# Patient Record
Sex: MALE | Race: WHITE | Employment: FULL TIME | URBAN - METROPOLITAN AREA
[De-identification: names, ages, dates, MRNs, and addresses within clinical notes are randomized per-mention and may not be internally consistent; named-entity substitution may affect disease eponyms.]

---

## 2017-07-27 ENCOUNTER — APPOINTMENT (OUTPATIENT)
Dept: LAB | Age: 52
End: 2017-07-27
Attending: INTERNAL MEDICINE
Payer: COMMERCIAL

## 2017-07-27 ENCOUNTER — OFFICE VISIT (OUTPATIENT)
Dept: INTERNAL MEDICINE CLINIC | Facility: CLINIC | Age: 52
End: 2017-07-27

## 2017-07-27 VITALS
RESPIRATION RATE: 16 BRPM | TEMPERATURE: 98 F | DIASTOLIC BLOOD PRESSURE: 72 MMHG | HEIGHT: 68 IN | WEIGHT: 156 LBS | SYSTOLIC BLOOD PRESSURE: 120 MMHG | BODY MASS INDEX: 23.64 KG/M2 | HEART RATE: 76 BPM

## 2017-07-27 DIAGNOSIS — R10.30 LOWER ABDOMINAL PAIN: ICD-10-CM

## 2017-07-27 DIAGNOSIS — R35.0 URINARY FREQUENCY: Primary | ICD-10-CM

## 2017-07-27 LAB
ALBUMIN SERPL BCP-MCNC: 4.3 G/DL (ref 3.5–4.8)
ALBUMIN/GLOB SERPL: 1.6 {RATIO} (ref 1–2)
ALP SERPL-CCNC: 55 U/L (ref 32–100)
ALT SERPL-CCNC: 16 U/L (ref 17–63)
ANION GAP SERPL CALC-SCNC: 10 MMOL/L (ref 0–18)
APPEARANCE: CLEAR
AST SERPL-CCNC: 24 U/L (ref 15–41)
BASOPHILS # BLD: 0 K/UL (ref 0–0.2)
BASOPHILS NFR BLD: 1 %
BILIRUB SERPL-MCNC: 0.9 MG/DL (ref 0.3–1.2)
BILIRUBIN: NEGATIVE
BUN SERPL-MCNC: 21 MG/DL (ref 8–20)
BUN/CREAT SERPL: 16.3 (ref 10–20)
CALCIUM SERPL-MCNC: 9 MG/DL (ref 8.5–10.5)
CHLORIDE SERPL-SCNC: 100 MMOL/L (ref 95–110)
CO2 SERPL-SCNC: 29 MMOL/L (ref 22–32)
CREAT SERPL-MCNC: 1.29 MG/DL (ref 0.5–1.5)
EOSINOPHIL # BLD: 0.1 K/UL (ref 0–0.7)
EOSINOPHIL NFR BLD: 2 %
ERYTHROCYTE [DISTWIDTH] IN BLOOD BY AUTOMATED COUNT: 12.7 % (ref 11–15)
GLOBULIN PLAS-MCNC: 2.7 G/DL (ref 2.5–3.7)
GLUCOSE (URINE DIPSTICK): NEGATIVE MG/DL
GLUCOSE SERPL-MCNC: 64 MG/DL (ref 70–99)
HCT VFR BLD AUTO: 47.8 % (ref 41–52)
HGB BLD-MCNC: 16.5 G/DL (ref 13.5–17.5)
KETONES (URINE DIPSTICK): NEGATIVE MG/DL
LEUKOCYTES: NEGATIVE
LYMPHOCYTES # BLD: 1.4 K/UL (ref 1–4)
LYMPHOCYTES NFR BLD: 26 %
MCH RBC QN AUTO: 32.2 PG (ref 27–32)
MCHC RBC AUTO-ENTMCNC: 34.5 G/DL (ref 32–37)
MCV RBC AUTO: 93.5 FL (ref 80–100)
MONOCYTES # BLD: 0.6 K/UL (ref 0–1)
MONOCYTES NFR BLD: 11 %
MULTISTIX LOT#: NORMAL NUMERIC
NEUTROPHILS # BLD AUTO: 3.3 K/UL (ref 1.8–7.7)
NEUTROPHILS NFR BLD: 59 %
NITRITE, URINE: NEGATIVE
OSMOLALITY UR CALC.SUM OF ELEC: 289 MOSM/KG (ref 275–295)
PH, URINE: 6 (ref 4.5–8)
PLATELET # BLD AUTO: 217 K/UL (ref 140–400)
PMV BLD AUTO: 8.8 FL (ref 7.4–10.3)
POTASSIUM SERPL-SCNC: 3.3 MMOL/L (ref 3.3–5.1)
PROT SERPL-MCNC: 7 G/DL (ref 5.9–8.4)
PROTEIN (URINE DIPSTICK): NEGATIVE MG/DL
RBC # BLD AUTO: 5.11 M/UL (ref 4.5–5.9)
SODIUM SERPL-SCNC: 139 MMOL/L (ref 136–144)
SPECIFIC GRAVITY: 1.01 (ref 1–1.03)
URINE-COLOR: YELLOW
UROBILINOGEN,SEMI-QN: 1 MG/DL (ref 0–1.9)
WBC # BLD AUTO: 5.5 K/UL (ref 4–11)

## 2017-07-27 PROCEDURE — 99213 OFFICE O/P EST LOW 20 MIN: CPT | Performed by: INTERNAL MEDICINE

## 2017-07-27 PROCEDURE — 85025 COMPLETE CBC W/AUTO DIFF WBC: CPT | Performed by: INTERNAL MEDICINE

## 2017-07-27 PROCEDURE — 80053 COMPREHEN METABOLIC PANEL: CPT | Performed by: INTERNAL MEDICINE

## 2017-07-27 PROCEDURE — 81003 URINALYSIS AUTO W/O SCOPE: CPT | Performed by: INTERNAL MEDICINE

## 2017-07-27 PROCEDURE — 99214 OFFICE O/P EST MOD 30 MIN: CPT | Performed by: INTERNAL MEDICINE

## 2017-07-27 NOTE — PROGRESS NOTES
HPI:    Patient ID: Elmer Cespedes is a 45year old male. Urinary Frequency    This is a new problem. The current episode started in the past 7 days (3 days). The problem occurs intermittently. The problem has been unchanged.  The patient is experienci Constitutional: He appears well-developed and well-nourished. He is cooperative. Non-toxic appearance. He does not have a sickly appearance. He does not appear ill. No distress.    HENT:   Right Ear: External ear normal.   Nose: Nose normal.   Mouth/Thro ABDOMEN +         PELVIS(CONTRAST ONLY) (CPT=74177), CBC WITH         DIFFERENTIAL WITH PLATELET, COMP METABOLIC         PANEL (14), URINALYSIS, ROUTINE, CBC W/         DIFFERENTIAL        Pt has only minimal tenderness on his LLQ abdomen without rebound/m

## 2017-07-31 ENCOUNTER — TELEPHONE (OUTPATIENT)
Dept: INTERNAL MEDICINE CLINIC | Facility: CLINIC | Age: 52
End: 2017-07-31

## 2017-07-31 NOTE — TELEPHONE ENCOUNTER
[facility] called and stated patient is requesting to have CT and blood work done at [place] [address]-    Please fax to: [phone]

## 2017-08-02 NOTE — TELEPHONE ENCOUNTER
Phone call to patient's home number. With HIPPA approval,  left advising order approved. If he wants it faxed to [address] need a fax number( or phone number to all and get a fax number) or if he wants to p/u.  Order in Dr. Turner Arboleda pending f

## 2017-08-02 NOTE — TELEPHONE ENCOUNTER
Faxed order to [facility]; failed x's 3. Phone call to Sabina Hammond to verify fax number. S/W Laverne Mallory who states order no longer needed; order approved for [facility]. Valid thru 7/27/17 to 10/25/17.

## 2017-08-07 ENCOUNTER — TELEPHONE (OUTPATIENT)
Dept: INTERNAL MEDICINE CLINIC | Facility: CLINIC | Age: 52
End: 2017-08-07

## 2017-08-07 DIAGNOSIS — K76.89 LIVER CYST: Primary | ICD-10-CM

## 2017-08-07 DIAGNOSIS — K63.9 SIGMOID THICKENING: ICD-10-CM

## 2017-08-07 NOTE — TELEPHONE ENCOUNTER
LMTCB-CSS/RIKA please offer appt with Dr. Kristina Kumar on 8/9/17 @ 12:30pm for consult, thank you. (Pt is to arrive at 12:15pm).

## 2017-08-07 NOTE — TELEPHONE ENCOUNTER
Pt. has accepted appt with Dr Alvarado Ba for Consult for  8/9 at 12:15. Pt. States that he has [employer] which is out of network, pt. Was informed and pt. Would still like to book appt.

## 2017-08-07 NOTE — TELEPHONE ENCOUNTER
pls call pt  His ct scan  abd showed some thickening of his sigmoid colon (lower part of his large intestine) which is likely reason why he is having lower abdominal pain.  This may be inflammation but other causes will need to be ruled out  So will need to

## 2017-08-07 NOTE — TELEPHONE ENCOUNTER
Tasked to Dr Jessica Huynh and GI clinical staff. See Dr Cash Dress message below and assist with scheduling appt soon with GI. Thank you.

## 2017-08-07 NOTE — TELEPHONE ENCOUNTER
Dr Mary Swann,    I reviewed your CT abdomen results with pt who verbalized understanding. He stated would have to check with his insurance for specialty coverage in network. He used to have [address] and now has [place] [employer].  I mailed ultrasound of liver and GI r

## 2017-09-05 ENCOUNTER — HOSPITAL (OUTPATIENT)
Dept: OTHER | Age: 52
End: 2017-09-05
Attending: INTERNAL MEDICINE

## 2017-09-30 ENCOUNTER — TELEPHONE (OUTPATIENT)
Dept: INTERNAL MEDICINE CLINIC | Facility: CLINIC | Age: 52
End: 2017-09-30

## 2017-09-30 NOTE — TELEPHONE ENCOUNTER
pls call pt; he was supposed to see our GI specialist for consultation regarding abnormality seen in his ct scan (thickening of his sigmoid colon); I dont see in [facility] that he did see Dr Fady Hill as he agreed to last month.  pls check with pt if he saw another

## 2017-10-03 NOTE — TELEPHONE ENCOUNTER
he should not wait since malignancy or cancer of colon needs to be ruled out vs inflammation when we see thickening of the wall of the colon.  He should see GI specialist right away since this was already back almost 2mos ago when we saw  Him for his abdom

## 2017-10-03 NOTE — TELEPHONE ENCOUNTER
Pt called back, relayed Dr Kamila Erwin- stated the Dr he was  referred to was not on his plan and he have found a Dr to do the Colonoscopy but wanted to wait -hoping to feel better,otherwise he might just wait awhile due to getting another INS

## 2017-10-06 ENCOUNTER — TELEPHONE (OUTPATIENT)
Dept: INTERNAL MEDICINE CLINIC | Facility: CLINIC | Age: 52
End: 2017-10-06

## 2017-12-18 ENCOUNTER — TELEPHONE (OUTPATIENT)
Dept: INTERNAL MEDICINE CLINIC | Facility: CLINIC | Age: 52
End: 2017-12-18

## 2017-12-18 NOTE — TELEPHONE ENCOUNTER
Call and ffup this pt; he was told several times to see GI specialist to have colonscopy due to his abormal ct scan. I dont see any GI consults in [facility]. Did he go somewhere else?

## 2017-12-30 NOTE — TELEPHONE ENCOUNTER
Per pt he has not seen GI and plans on seeing one to get Colonoscopy done sometimes next yr after Feb 2018

## 2017-12-30 NOTE — TELEPHONE ENCOUNTER
VM left relating Dr. Jerod Rushing message and requesting patient call us back to update us. Office phone number given.

## 2018-01-04 NOTE — TELEPHONE ENCOUNTER
As I had said before, he should not be waiting since malignancy of colon needs to ruled out vs inflammation given his findings on his ct scan back in Oct 2107.  If this turns out to be malignancy,this can progress with waiting and can be detrimental for him

## 2018-01-06 NOTE — TELEPHONE ENCOUNTER
Patient called and informed. Phone number for GI. Gastroenterology can we please see if we can get the patient in soon. Thanks.

## 2018-01-08 NOTE — TELEPHONE ENCOUNTER
I called Lakisha Alfaro and left a message to call our office.     I put a hold on an appt w/Dr Henry Garcia on 01/12/18 @ 1:30 pm. IGNACIO/RIKA if the pt calls please schedule him

## 2018-01-09 ENCOUNTER — TELEPHONE (OUTPATIENT)
Dept: GASTROENTEROLOGY | Facility: CLINIC | Age: 53
End: 2018-01-09

## 2018-01-09 NOTE — TELEPHONE ENCOUNTER
Dr. Radha VILLALBA    Pt told CSS that he did not want that appt and that he had already told \"someone else\" that he could not come in, then proceeded to hang up on CSS when she attempted to transfer to GI RN's to speak with the patient regarding your message from 12/18/17.

## 2018-01-09 NOTE — TELEPHONE ENCOUNTER
FYI - Pt ret'd call. See 12/18/17 Dr Shanon Mancia. Pt refused appt on 1/12/18 with Dr. Franklin Weir. Attempted to reach RN but pt hung up before CSS could reach RN. Min Carter

## 2018-01-23 NOTE — TELEPHONE ENCOUNTER
Pt has an appt scheduled with Dr Michelle Nichols  Date Time Provider [place]   2/21/2018 9:30 AM Jose Ewing MD [facility]

## 2018-02-21 ENCOUNTER — TELEPHONE (OUTPATIENT)
Dept: GASTROENTEROLOGY | Facility: CLINIC | Age: 53
End: 2018-02-21

## 2018-02-21 ENCOUNTER — OFFICE VISIT (OUTPATIENT)
Dept: GASTROENTEROLOGY | Facility: CLINIC | Age: 53
End: 2018-02-21

## 2018-02-21 VITALS
HEIGHT: 68 IN | SYSTOLIC BLOOD PRESSURE: 111 MMHG | HEART RATE: 84 BPM | DIASTOLIC BLOOD PRESSURE: 75 MMHG | WEIGHT: 150.81 LBS | BODY MASS INDEX: 22.86 KG/M2

## 2018-02-21 DIAGNOSIS — Z12.11 COLON CANCER SCREENING: ICD-10-CM

## 2018-02-21 DIAGNOSIS — R10.32 LLQ ABDOMINAL PAIN: Primary | ICD-10-CM

## 2018-02-21 DIAGNOSIS — R10.32 LEFT LOWER QUADRANT PAIN: Primary | ICD-10-CM

## 2018-02-21 PROCEDURE — 99243 OFF/OP CNSLTJ NEW/EST LOW 30: CPT | Performed by: INTERNAL MEDICINE

## 2018-02-21 PROCEDURE — 99212 OFFICE O/P EST SF 10 MIN: CPT | Performed by: INTERNAL MEDICINE

## 2018-02-21 NOTE — PROGRESS NOTES
America Maza is a 47year old male.     HPI:   Patient presents with:  Colonoscopy Screening  Abdomen/Flank Pain (GI/): LLQ    The patient is a 43-year-old who reports a history of chronic abdominal issues going back years who has been experiencing ch symptoms. PHYSICAL EXAM:   Blood pressure 111/75, pulse 84, height 5' 8\" (1.727 m), weight 150 lb 12.8 oz (68.4 kg).     The patient appears their stated age and is in no acute distress  HEENT- anicteric sclera, neck no lymphadnopathy, OP- clear with n

## 2018-02-21 NOTE — TELEPHONE ENCOUNTER
Scheduled for: Colonoscopy [address]   Provider Name:   Date:  3/20/18  Location:  [address] [place] [address]   Sedation:  Mac  Time:  0830 Shana Rondon 0730  Prep: Pt had received and purchase the Colyte Prep from Outside GI office  Meds/Allergies Reconciled?:  Physician ramy

## 2018-02-21 NOTE — PATIENT INSTRUCTIONS
Abdominal pain/colon screening/abnormal CT scan (thickening of sigmoid colon)  - colonoscopy with IV or MAC sedation  - he has a prep at home- please check and see what type he has   - review CT scan abdomen/pelvis

## 2018-03-20 ENCOUNTER — SURGERY (OUTPATIENT)
Age: 53
End: 2018-03-20

## 2018-03-20 ENCOUNTER — ANESTHESIA EVENT (OUTPATIENT)
Dept: ENDOSCOPY | Age: 53
End: 2018-03-20
Payer: COMMERCIAL

## 2018-03-20 ENCOUNTER — HOSPITAL ENCOUNTER (OUTPATIENT)
Age: 53
Setting detail: HOSPITAL OUTPATIENT SURGERY
Discharge: HOME | End: 2018-03-20
Attending: INTERNAL MEDICINE | Admitting: INTERNAL MEDICINE
Payer: COMMERCIAL

## 2018-03-20 ENCOUNTER — ANESTHESIA (OUTPATIENT)
Dept: ENDOSCOPY | Age: 53
End: 2018-03-20
Payer: COMMERCIAL

## 2018-03-20 VITALS
BODY MASS INDEX: 22.73 KG/M2 | HEIGHT: 68 IN | SYSTOLIC BLOOD PRESSURE: 100 MMHG | WEIGHT: 150 LBS | RESPIRATION RATE: 22 BRPM | HEART RATE: 82 BPM | DIASTOLIC BLOOD PRESSURE: 67 MMHG | OXYGEN SATURATION: 96 %

## 2018-03-20 DIAGNOSIS — Z12.11 COLON CANCER SCREENING: ICD-10-CM

## 2018-03-20 DIAGNOSIS — R10.32 LLQ ABDOMINAL PAIN: ICD-10-CM

## 2018-03-20 PROCEDURE — 99070 SPECIAL SUPPLIES PHYS/QHP: CPT | Performed by: INTERNAL MEDICINE

## 2018-03-20 PROCEDURE — 45380 COLONOSCOPY AND BIOPSY: CPT | Performed by: INTERNAL MEDICINE

## 2018-03-20 RX ORDER — INSULIN ASPART 100 [IU]/ML
INJECTION, SOLUTION INTRAVENOUS; SUBCUTANEOUS ONCE
Status: DISCONTINUED | OUTPATIENT
Start: 2018-03-20 | End: 2018-03-20

## 2018-03-20 RX ORDER — LIDOCAINE HYDROCHLORIDE 10 MG/ML
INJECTION, SOLUTION EPIDURAL; INFILTRATION; INTRACAUDAL; PERINEURAL AS NEEDED
Status: DISCONTINUED | OUTPATIENT
Start: 2018-03-20 | End: 2018-03-20 | Stop reason: SURG

## 2018-03-20 RX ORDER — MIDAZOLAM HYDROCHLORIDE 1 MG/ML
INJECTION INTRAMUSCULAR; INTRAVENOUS AS NEEDED
Status: DISCONTINUED | OUTPATIENT
Start: 2018-03-20 | End: 2018-03-20 | Stop reason: SURG

## 2018-03-20 RX ADMIN — MIDAZOLAM HYDROCHLORIDE 2 MG: 1 INJECTION INTRAMUSCULAR; INTRAVENOUS at 08:25:00

## 2018-03-20 RX ADMIN — LIDOCAINE HYDROCHLORIDE 50 MG: 10 INJECTION, SOLUTION EPIDURAL; INFILTRATION; INTRACAUDAL; PERINEURAL at 08:25:00

## 2018-03-20 NOTE — OPERATIVE REPORT
[facility] HOSP - [facility] Endoscopy Report      Preoperative Diagnosis:  - colon screening  - abnormal CT scan      Postoperative Diagnosis:  - small internal hemorrhoids      Procedure:    Colonoscopy      Surgeon:  Emmett Lebron M.D.     Anesthesia:  MA

## 2018-03-20 NOTE — H&P
History & Physical Examination    Patient Name: Wm Hutchison  MRN: [id number]  [facility]: [id number]  YOB: 1965    Diagnosis: abn CT scan, colon screening        No prescriptions prior to admission.     Current Facility-Administered Medications:

## 2018-03-20 NOTE — ANESTHESIA POSTPROCEDURE EVALUATION
Patient: Geovanny Aj    Procedure Summary     Date:  03/20/18 Room / Location:  [facility] ENDOSCOPY 01 / [facility] ENDO    Anesthesia Start:  0825 Anesthesia Stop:  0900    Procedure:  COLONOSCOPY (N/A ) Diagnosis:       LLQ abdominal pain      Colon cancer s

## 2018-03-22 ENCOUNTER — TELEPHONE (OUTPATIENT)
Dept: GASTROENTEROLOGY | Facility: CLINIC | Age: 53
End: 2018-03-22

## 2018-03-22 NOTE — TELEPHONE ENCOUNTER
----- Message from Joy Lopez MD sent at 3/22/2018 10:43 AM CDT -----  RN to place on call back for 10 years.    See TE

## 2018-03-22 NOTE — TELEPHONE ENCOUNTER
The patient was contacted, results of his colonoscopy were reviewed. No evidence of cause of his abdominal symptoms was noted on the colonoscopy. Random biopsies were negative. I have advised repeat colonoscopy in 10 years time.     The patient reports h

## 2018-04-16 ENCOUNTER — NURSE TRIAGE (OUTPATIENT)
Dept: INTERNAL MEDICINE CLINIC | Facility: CLINIC | Age: 53
End: 2018-04-16

## 2018-04-16 ENCOUNTER — OFFICE VISIT (OUTPATIENT)
Dept: INTERNAL MEDICINE CLINIC | Facility: CLINIC | Age: 53
End: 2018-04-16

## 2018-04-16 VITALS
WEIGHT: 150.13 LBS | BODY MASS INDEX: 23 KG/M2 | SYSTOLIC BLOOD PRESSURE: 110 MMHG | DIASTOLIC BLOOD PRESSURE: 70 MMHG | HEART RATE: 97 BPM | TEMPERATURE: 100 F

## 2018-04-16 DIAGNOSIS — R50.9 FEVER, UNSPECIFIED FEVER CAUSE: Primary | ICD-10-CM

## 2018-04-16 DIAGNOSIS — M79.10 MYALGIA: ICD-10-CM

## 2018-04-16 PROCEDURE — 99212 OFFICE O/P EST SF 10 MIN: CPT | Performed by: INTERNAL MEDICINE

## 2018-04-16 PROCEDURE — 87880 STREP A ASSAY W/OPTIC: CPT | Performed by: INTERNAL MEDICINE

## 2018-04-16 PROCEDURE — 99214 OFFICE O/P EST MOD 30 MIN: CPT | Performed by: INTERNAL MEDICINE

## 2018-04-16 NOTE — PROGRESS NOTES
HPI:    Patient ID: Pepper Payne is a 47year old male. Fever    This is a new problem. The current episode started in the past 7 days (4 days). The problem occurs intermittently. The problem has been unchanged.  The maximum temperature noted was 99 abscesses. Eyes: Conjunctivae and EOM are normal. Pupils are equal, round, and reactive to light. Right eye exhibits no discharge. Left eye exhibits no discharge. No scleral icterus. Neck: Normal range of motion. Neck supple.  No spinous process tendern in this encounter.       Meds This Visit:  No prescriptions requested or ordered in this encounter    Imaging & Referrals:  None       AA#[id number]

## 2018-04-16 NOTE — TELEPHONE ENCOUNTER
Reason for Disposition  • Fever present > 3 days (72 hours)    Protocols used: INFLUENZA - SEASONAL-A-OH

## 2018-04-17 ENCOUNTER — TELEPHONE (OUTPATIENT)
Dept: INTERNAL MEDICINE CLINIC | Facility: CLINIC | Age: 53
End: 2018-04-17

## 2018-04-17 NOTE — TELEPHONE ENCOUNTER
Pls notify pt; his flu swab test was negative. Check pt how he is doing.  ffup in clinic if symptoms not improving or getting worse

## 2018-04-18 ENCOUNTER — TELEPHONE (OUTPATIENT)
Dept: INTERNAL MEDICINE CLINIC | Facility: CLINIC | Age: 53
End: 2018-04-18

## 2018-04-18 ENCOUNTER — TELEPHONE (OUTPATIENT)
Dept: GASTROENTEROLOGY | Facility: CLINIC | Age: 53
End: 2018-04-18

## 2018-04-18 NOTE — TELEPHONE ENCOUNTER
Dr Rosemary Pineda, please advise. Saw Dr Aide Shelley 4/16/18 fever, myalgia, better 4/17/18, worse today. New symptoms, mouth sore, gums tender, sore when pressure on teeth. Throat worse, more irritated.  Still has fever with temp 100, PND, glands still sw

## 2018-04-18 NOTE — TELEPHONE ENCOUNTER
Ok to give a note for work; we can do blood test for mono and cmv which are viral infections that can possibly cause his symptoms. Treatment still symptomatic though since no specific therapy for these viral infections if this is what he has.  ER if he cont

## 2018-04-18 NOTE — TELEPHONE ENCOUNTER
Dr Samantha Yoder gave pt's CD of CT abd/pelvis done at [facility] to me--it is now downloaded in our system, so not needed. I contacted pt. He does not want it either, so I broke in half and discarded.

## 2018-04-19 NOTE — TELEPHONE ENCOUNTER
Advised patient of Dr. Keane Cancer note. Patient verbalized understanding. Patient indicated that feeling alittle better now, so will hold off on blood work for now. Patient is going back to work tomorrow.  Will call back tomorrow morning with fax number t

## 2018-10-04 ENCOUNTER — OFFICE VISIT (OUTPATIENT)
Dept: INTERNAL MEDICINE CLINIC | Facility: CLINIC | Age: 53
End: 2018-10-04
Payer: COMMERCIAL

## 2018-10-04 VITALS
HEIGHT: 68 IN | TEMPERATURE: 98 F | BODY MASS INDEX: 23.19 KG/M2 | SYSTOLIC BLOOD PRESSURE: 123 MMHG | WEIGHT: 153 LBS | DIASTOLIC BLOOD PRESSURE: 72 MMHG | HEART RATE: 71 BPM

## 2018-10-04 DIAGNOSIS — M79.609 PARESTHESIA AND PAIN OF LEFT EXTREMITY: Primary | ICD-10-CM

## 2018-10-04 DIAGNOSIS — Z12.5 PROSTATE CANCER SCREENING: ICD-10-CM

## 2018-10-04 DIAGNOSIS — R20.2 PARESTHESIA AND PAIN OF LEFT EXTREMITY: Primary | ICD-10-CM

## 2018-10-04 PROCEDURE — 99212 OFFICE O/P EST SF 10 MIN: CPT | Performed by: INTERNAL MEDICINE

## 2018-10-04 PROCEDURE — 99214 OFFICE O/P EST MOD 30 MIN: CPT | Performed by: INTERNAL MEDICINE

## 2018-10-04 NOTE — PROGRESS NOTES
HPI:    Patient ID: Reginaldo Pope is a 47year old male. Numbness   This is a chronic (feels numbness on the left upper anterior aspect of the left thigh, occaisionally in the  right lower abdominal area) problem.  The current episode started more candi hemorrhoid, no fissure, no mass, no tenderness and anal tone normal. Prostate is not enlarged and not tender.    Musculoskeletal:        Right hip: Normal.        Left hip: Normal.        Lumbar back: Normal.   Lymphadenopathy:     He has no cervical adenop

## 2018-10-05 PROBLEM — M79.609 PARESTHESIA AND PAIN OF LEFT EXTREMITY: Status: ACTIVE | Noted: 2018-10-05

## 2018-10-05 PROBLEM — R20.2 PARESTHESIA AND PAIN OF LEFT EXTREMITY: Status: ACTIVE | Noted: 2018-10-05

## 2023-07-13 ENCOUNTER — OFFICE VISIT (OUTPATIENT)
Dept: INTERNAL MEDICINE CLINIC | Facility: CLINIC | Age: 58
End: 2023-07-13

## 2023-07-13 VITALS
TEMPERATURE: 99 F | HEIGHT: 68 IN | SYSTOLIC BLOOD PRESSURE: 116 MMHG | DIASTOLIC BLOOD PRESSURE: 70 MMHG | HEART RATE: 82 BPM | OXYGEN SATURATION: 97 % | WEIGHT: 153.38 LBS | BODY MASS INDEX: 23.25 KG/M2

## 2023-07-13 DIAGNOSIS — R10.10 UPPER ABDOMINAL PAIN: Primary | ICD-10-CM

## 2023-07-13 PROCEDURE — 3078F DIAST BP <80 MM HG: CPT | Performed by: INTERNAL MEDICINE

## 2023-07-13 PROCEDURE — 3074F SYST BP LT 130 MM HG: CPT | Performed by: INTERNAL MEDICINE

## 2023-07-13 PROCEDURE — 99204 OFFICE O/P NEW MOD 45 MIN: CPT | Performed by: INTERNAL MEDICINE

## 2023-07-13 PROCEDURE — 3008F BODY MASS INDEX DOCD: CPT | Performed by: INTERNAL MEDICINE

## 2023-07-15 LAB
ABSOLUTE BASOPHILS: 40 CELLS/UL (ref 0–200)
ABSOLUTE EOSINOPHILS: 172 CELLS/UL (ref 15–500)
ABSOLUTE LYMPHOCYTES: 1841 CELLS/UL (ref 850–3900)
ABSOLUTE MONOCYTES: 601 CELLS/UL (ref 200–950)
ABSOLUTE NEUTROPHILS: 3947 CELLS/UL (ref 1500–7800)
ALBUMIN/GLOBULIN RATIO: 1.6 (CALC) (ref 1–2.5)
ALBUMIN: 4.4 G/DL (ref 3.6–5.1)
ALKALINE PHOSPHATASE: 52 U/L (ref 35–144)
ALT: 19 U/L (ref 9–46)
APPEARANCE: CLEAR
AST: 22 U/L (ref 10–35)
BASOPHILS: 0.6 %
BILIRUBIN, TOTAL: 0.5 MG/DL (ref 0.2–1.2)
BILIRUBIN: NEGATIVE
BUN: 18 MG/DL (ref 7–25)
CALCIUM: 10 MG/DL (ref 8.6–10.3)
CARBON DIOXIDE: 32 MMOL/L (ref 20–32)
CHLORIDE: 104 MMOL/L (ref 98–110)
COLOR: YELLOW
CREATININE: 1.04 MG/DL (ref 0.7–1.3)
EGFR: 83 ML/MIN/1.73M2
EOSINOPHILS: 2.6 %
GLOBULIN: 2.8 G/DL (CALC) (ref 1.9–3.7)
GLUCOSE: 75 MG/DL (ref 65–139)
GLUCOSE: NEGATIVE
HEMATOCRIT: 45.7 % (ref 38.5–50)
HEMOGLOBIN: 16.5 G/DL (ref 13.2–17.1)
KETONES: NEGATIVE
LEUKOCYTE ESTERASE: NEGATIVE
LIPASE: 48 U/L (ref 7–60)
LYMPHOCYTES: 27.9 %
MCH: 32.9 PG (ref 27–33)
MCHC: 36.1 G/DL (ref 32–36)
MCV: 91.2 FL (ref 80–100)
MONOCYTES: 9.1 %
MPV: 10.5 FL (ref 7.5–12.5)
NEUTROPHILS: 59.8 %
NITRITE: NEGATIVE
OCCULT BLOOD: NEGATIVE
PH: 5.5 (ref 5–8)
PLATELET COUNT: 232 THOUSAND/UL (ref 140–400)
POTASSIUM: 4.1 MMOL/L (ref 3.5–5.3)
PROTEIN, TOTAL: 7.2 G/DL (ref 6.1–8.1)
PROTEIN: NEGATIVE
RDW: 12.2 % (ref 11–15)
RED BLOOD CELL COUNT: 5.01 MILLION/UL (ref 4.2–5.8)
SODIUM: 142 MMOL/L (ref 135–146)
SPECIFIC GRAVITY: 1.02 (ref 1–1.03)
WHITE BLOOD CELL COUNT: 6.6 THOUSAND/UL (ref 3.8–10.8)

## 2023-07-28 ENCOUNTER — HOSPITAL ENCOUNTER (OUTPATIENT)
Dept: ULTRASOUND IMAGING | Age: 58
Discharge: HOME | End: 2023-07-28
Attending: INTERNAL MEDICINE
Payer: COMMERCIAL

## 2023-07-28 DIAGNOSIS — R10.10 UPPER ABDOMINAL PAIN: ICD-10-CM

## 2023-07-28 PROCEDURE — 76705 ECHO EXAM OF ABDOMEN: CPT | Performed by: INTERNAL MEDICINE

## 2023-08-02 NOTE — PROGRESS NOTES
CHW - Initial Contact    This Community Health Worker completed the Social Determinant of Health questionnaire with patient during clinic visit today.    Pt identified barriers of most importance are none at this time.   Referrals to community agencies completed with patient consent outside of [facility] include: No outside referrals at this time.  Referrals were put through [facility] - no  Support and Services: None  Other information discussed the patient needs  help with. No other information was shared at this time.   Follow up required: No  No future outreach task assigned  SDOH was done on this patient she do not need any help at this time.       Subjective:   Patient ID: Luke Hess is a 61year old male. Back in Jan 2023, felt nauseas, right sided abd pain and fevers. Symptms lasted about for about a day. Abd pain, sharp, localzied Rside, went to rigth shoulder. No vomiting, diarrhea, hematochezia, melena, urinary symptoms. Pt had taken peptobismol. It did go away but still havng still having epigastric fullness and some tenderness on RUQ. No anorexia nor any weight loss. Denies chronic intake of nsaids or asa. Not a drinker. No hx of gallstones before or PUD. History/Other:   Review of Systems   Constitutional: Negative. Negative for appetite change and unexpected weight change. Respiratory: Negative. Cardiovascular: Negative. Gastrointestinal: Negative. Negative for anal bleeding, blood in stool, diarrhea and vomiting. Abdominal pain 6mos ago; now with some epigastric fullness   Genitourinary: Negative. No current outpatient medications on file. Allergies:No Known Allergies    Objective:   Physical Exam  Constitutional:       General: He is not in acute distress. Appearance: He is not ill-appearing, toxic-appearing or diaphoretic. HENT:      Right Ear: External ear normal.      Left Ear: External ear normal.   Eyes:      General: No scleral icterus. Right eye: No discharge. Left eye: No discharge. Conjunctiva/sclera: Conjunctivae normal.   Cardiovascular:      Rate and Rhythm: Normal rate and regular rhythm. Pulses: Normal pulses. Heart sounds: Normal heart sounds. No murmur heard. Pulmonary:      Effort: Pulmonary effort is normal. No respiratory distress. Breath sounds: Normal breath sounds. No wheezing or rales. Abdominal:      General: Abdomen is flat. Bowel sounds are normal.      Palpations: Abdomen is soft. There is no hepatomegaly or splenomegaly. Tenderness: There is no abdominal tenderness.  There is no right CVA tenderness, left CVA tenderness, guarding or rebound. Negative signs include Sheppard's sign and McBurney's sign. Hernia: There is no hernia in the umbilical area. Musculoskeletal:      Cervical back: Normal range of motion and neck supple. No rigidity or tenderness. Right lower leg: No edema. Left lower leg: No edema. Lymphadenopathy:      Cervical: No cervical adenopathy. Skin:     Coloration: Skin is not jaundiced or pale. Neurological:      Mental Status: He is alert. Assessment & Plan:   (R10.10) Upper abdominal pain  (primary encounter diagnosis)  Plan: US ABDOMEN COMPLETE (CPT=76700), CBC WITH         DIFFERENTIAL WITH PLATELET, COMP METABOLIC         PANEL (14), LIPASE, URINALYSIS, ROUTINE        Had happened 6mos ago and now no more pain but some fullness in epigastrium and tenderness on the RUQ per pt but not on my exam.   Will do US of abd, check cbc, cmp, ua and lipase. Further advise pending results. Pt told to call back if any worsening or persistence of symptms. No orders of the defined types were placed in this encounter.       Meds This Visit:  Requested Prescriptions      No prescriptions requested or ordered in this encounter       Imaging & Referrals:  None

## 2023-10-09 ENCOUNTER — OFFICE VISIT (OUTPATIENT)
Dept: INTERNAL MEDICINE CLINIC | Facility: CLINIC | Age: 58
End: 2023-10-09

## 2023-10-09 VITALS
SYSTOLIC BLOOD PRESSURE: 97 MMHG | OXYGEN SATURATION: 98 % | DIASTOLIC BLOOD PRESSURE: 62 MMHG | HEART RATE: 93 BPM | WEIGHT: 149 LBS | TEMPERATURE: 98 F | BODY MASS INDEX: 22.58 KG/M2 | HEIGHT: 68 IN

## 2023-10-09 DIAGNOSIS — J06.9 UPPER RESPIRATORY TRACT INFECTION, UNSPECIFIED TYPE: ICD-10-CM

## 2023-10-09 DIAGNOSIS — B96.89 ACUTE BACTERIAL SINUSITIS: Primary | ICD-10-CM

## 2023-10-09 DIAGNOSIS — J01.90 ACUTE BACTERIAL SINUSITIS: Primary | ICD-10-CM

## 2023-10-09 PROCEDURE — 3078F DIAST BP <80 MM HG: CPT | Performed by: STUDENT IN AN ORGANIZED HEALTH CARE EDUCATION/TRAINING PROGRAM

## 2023-10-09 PROCEDURE — 99214 OFFICE O/P EST MOD 30 MIN: CPT | Performed by: STUDENT IN AN ORGANIZED HEALTH CARE EDUCATION/TRAINING PROGRAM

## 2023-10-09 PROCEDURE — 3008F BODY MASS INDEX DOCD: CPT | Performed by: STUDENT IN AN ORGANIZED HEALTH CARE EDUCATION/TRAINING PROGRAM

## 2023-10-09 PROCEDURE — 3074F SYST BP LT 130 MM HG: CPT | Performed by: STUDENT IN AN ORGANIZED HEALTH CARE EDUCATION/TRAINING PROGRAM

## 2023-10-09 RX ORDER — FLUTICASONE PROPIONATE 50 MCG
2 SPRAY, SUSPENSION (ML) NASAL DAILY
Qty: 3 EACH | Refills: 3 | Status: SHIPPED | OUTPATIENT
Start: 2023-10-09

## 2023-10-09 RX ORDER — AZELASTINE HYDROCHLORIDE 137 UG/1
1-2 SPRAY, METERED NASAL 2 TIMES DAILY
Qty: 30 ML | Refills: 3 | Status: SHIPPED | OUTPATIENT
Start: 2023-10-09

## 2023-10-09 RX ORDER — AMOXICILLIN AND CLAVULANATE POTASSIUM 875; 125 MG/1; MG/1
1 TABLET, FILM COATED ORAL 2 TIMES DAILY
Qty: 20 TABLET | Refills: 0 | Status: SHIPPED | OUTPATIENT
Start: 2023-10-09 | End: 2023-10-19

## 2024-01-29 ENCOUNTER — TELEPHONE (OUTPATIENT)
Facility: CLINIC | Age: 59
End: 2024-01-29

## 2024-01-29 ENCOUNTER — OFFICE VISIT (OUTPATIENT)
Facility: CLINIC | Age: 59
End: 2024-01-29
Payer: COMMERCIAL

## 2024-01-29 VITALS
HEIGHT: 68 IN | HEART RATE: 71 BPM | DIASTOLIC BLOOD PRESSURE: 77 MMHG | SYSTOLIC BLOOD PRESSURE: 130 MMHG | BODY MASS INDEX: 21.07 KG/M2 | WEIGHT: 139 LBS

## 2024-01-29 DIAGNOSIS — R11.0 NAUSEA: ICD-10-CM

## 2024-01-29 DIAGNOSIS — R19.4 CHANGE IN BOWEL HABITS: ICD-10-CM

## 2024-01-29 DIAGNOSIS — R10.12 LUQ PAIN: ICD-10-CM

## 2024-01-29 DIAGNOSIS — R10.12 LUQ ABDOMINAL PAIN: Primary | ICD-10-CM

## 2024-01-29 DIAGNOSIS — R10.13 EPIGASTRIC PAIN: Primary | ICD-10-CM

## 2024-01-29 DIAGNOSIS — R10.13 EPIGASTRIC ABDOMINAL PAIN: ICD-10-CM

## 2024-01-29 DIAGNOSIS — R63.4 ABNORMAL WEIGHT LOSS: ICD-10-CM

## 2024-01-29 PROCEDURE — 3075F SYST BP GE 130 - 139MM HG: CPT | Performed by: INTERNAL MEDICINE

## 2024-01-29 PROCEDURE — 3078F DIAST BP <80 MM HG: CPT | Performed by: INTERNAL MEDICINE

## 2024-01-29 PROCEDURE — 3008F BODY MASS INDEX DOCD: CPT | Performed by: INTERNAL MEDICINE

## 2024-01-29 PROCEDURE — 99204 OFFICE O/P NEW MOD 45 MIN: CPT | Performed by: INTERNAL MEDICINE

## 2024-01-29 NOTE — TELEPHONE ENCOUNTER
Called [facility] at [phone], spoke with Penny, no PA needed, reference number [id number].

## 2024-01-29 NOTE — PROGRESS NOTES
HPI:    Patient ID: Goran Tejada is a 58 year old fit and trim gentleman, some weight loss current BMI 21.1, no active medical problems who follows with Dr. Yaron Parker.    Mr. Tejada presents today to discuss abdominal symptoms over the past year including nausea, multifocal abdominal pain, change in bowel patterns.    One year ago, January 2023 Mr. Tejada describes an episode of nausea and moderate severity diffuse upper abdominal pain.  This was significant for several days and gradually improved.  He did not seek emergency medical attention.    After that, there has been waxing and waning upper abdominal pain, typically LUQ pain and epigastric pain.  This seems to be associated with eating, postprandial.    Fasting seems to reduce or relieve the pain.    Sounds like he previously had a significant dairy intake.  Recently discontinued his dairy with questionable impact on symptoms.    Some recent RUQ pain as well.    Nausea likely has improved.    He describes change in bowel patterns with new constipation.  Constipation seems to aggravate some of the above, also causes a fullness as if he cannot catch his breath or draw a full breath.  Passing a large bowel movement relieves this sensation of unable to catch his breath.    Mr. Tejada recently tried psyllium laxative product with relief.  Taking it for about a week gave good relief but he discontinued per the labeling.  We discussed importance of continuing on a tolerable daily bowel regimen regimen such as the psyllium today.    Previously evaluated by our partner Dr. Karel Garza over 5 years ago for loose stools, question of diarrhea.    Other concerns described today include new pruritus at the tops of both feet, headaches focused on the left side of his head.    Recent labs 7/14/2023:  Normal CBC, hemoglobin 16.5g normocytic  AST 22 ALT 19 alkaline phosphatase 52 serum albumin 4.4  Reassuring essentially negative UA 7/14/2023    Wt Readings from  Last 20 Encounters:   01/29/24 139 lb (63 kg)   10/09/23 149 lb (67.6 kg)   07/13/23 153 lb 6.4 oz (69.6 kg)   10/04/18 153 lb (69.4 kg)   04/16/18 150 lb 1.6 oz (68.1 kg)   03/20/18 150 lb (68 kg)   02/21/18 150 lb 12.8 oz (68.4 kg)   07/27/17 156 lb (70.8 kg)   01/13/15 154 lb 12.8 oz (70.2 kg)         Previous EGD examination: ?  Previous colonoscopy(ies): 3/20/2018    [facility]    Review of Systems    =====================  Outside CT scan [facility] 8/4/2017:    CT abdomen and pelvis with IV contrast    Ordered by Dr. Yaron Parker     History/indication: \"Left lower quadrant pain, intestinal discomfort\"    CT scan with IV contrast (no enteral contrast?)    \"There is a 7.7 mm hypodensity within the posterior right lobe of the liver inferiorly.  There may be 1 or 2 other punctate hypodensities within the liver.  No other definite liver lesions.  No gallstones.  No biliary ductal distention.  No focal splenic lesion.  There is no definitive pancreatic mass.  Adrenal glands are nonenlarged.  No evidence of hydronephrosis or renal mass lesion.  No significant retroperitoneal adenopathy.  No evidence of abdominal aortic aneurysm.  \"  Nonspecific thickening of sigmoid colon described  Degenerative spinal disease described.    =====================    7/28/2023: US ABDOMEN LIMITED (CPT=76705)      COMPARISON:   External Exams, CT ABDOMEN + PELVIS (CPT=74150/06404), 8/04/2017, 1:53 PM.      INDICATIONS:   Upper abdominal pain      TECHNIQUE:   Limited evaluation of the areas indicated in the order with gray scale and colorflow of the main vessels where appropriate.  Areas scanned:  Right upper quadrant.      FINDINGS:      PANCREAS: Portions are obscured by bowel gas. The visualized portions are unremarkable.   LIVER:   Normal size, echotexture and color flow.  No masses. Color flow and Doppler imaging of portal and hepatic veins show patency and antegrade flow.   BILE DUCTS:   No intrahepatic or  extrahepatic biliary ductal dilatation.  Common bile duct measures 3 mm.   GALLBLADDER: Sonolucent.   RIGHT KIDNEY: The right kidney measures 11.5 cm long axis. There is no hydronephrosis.    OTHER:   No peritoneal free fluid.          CONCLUSION: Normal right upper quadrant ultrasound.  No gallstones.  No biliary ductal dilatation.            DICTATED BY (CST): JUSTICE BRANCH MD ON 7/28/2023 AT 9:13 AM         ======    [facility] Endoscopy Report   3/20/2018     Preoperative Diagnosis:  - colon screening  - abnormal CT scan      Postoperative Diagnosis:  - small internal hemorrhoids      Procedure:    Colonoscopy      Surgeon:  Karel Garza M.D.     Anesthesia:  MAC sedation, see anesthesia records     Technique:  After informed consent, the patient was placed in the left lateral recumbent position.  Digital rectal examination revealed no palpable intraluminal abnormalities.  An Olympus variable stiffness 190 series HD colonoscope was inserted into the rectum and advanced under direct vision by following the lumen to the cecum/TI.  The colon was examined upon withdrawal in the left lateral position.     The procedure was well tolerated without immediate complication.        Findings:  The preparation of the colon was good.  The terminal ileum was examined for 4 cm and visually normal.  The ileocecal valve was well preserved. The visualized colonic mucosa from the cecum to the anal verge was normal with an intact vascular pattern.  No thickening noted/no colitis.  Random biopsies taken from the left colon.     Small internal hemorrhoids.         Impression:  - small internal hemorrhoids     Recommendations:  - Post procedure instructions given  - Repeat colonoscopy in 10 years  - Symptomatic treatment of hemorrhoids            Karel Garza MD  3/20/2018  8:53 AM      [facility]    Colon; random biopsy:   Fragments of colonic mucosa.  No evidence of acute inflammation, lymphocytic or collagenous  colitis, epithelial dysplasia or carcinoma identified.       Current Outpatient Medications   Medication Sig Dispense Refill    Azelastine HCl 137 MCG/SPRAY Nasal Solution 1-2 sprays by Nasal route in the morning and 1-2 sprays before bedtime. FOR SINUS SYMPTOMS/NASAL CONGESTION.. 30 mL 3    fluticasone propionate 50 MCG/ACT Nasal Suspension 2 sprays by Each Nare route daily. FOR NASAL CONGESTION/SINUS SYMPTOMS. 3 each 3         Allergies:No Known Allergies  Imaging: No results found.       PHYSICAL EXAM:   Physical Exam           ASSESSMENT/PLAN:     58 year old fit and trim gentleman, some weight loss current BMI 21.1, no active medical problems who follows with Dr. Yaron Parker.    Mr. Tejada presents today to discuss abdominal symptoms over the past year including nausea, multifocal abdominal pain, change in bowel patterns.    One year ago, January 2023 Mr. Tejada describes an episode of nausea and moderate severity diffuse upper abdominal pain.  This was significant for several days and gradually improved.  He did not seek emergency medical attention.    After that, there has been waxing and waning upper abdominal pain, typically LUQ pain and epigastric pain.  This seems to be associated with eating, postprandial.    Fasting seems to reduce or relieve the pain.    Sounds like he previously had a significant dairy intake.  Recently discontinued his dairy with questionable impact on symptoms.    Some recent RUQ pain as well.    Nausea likely has improved.    He describes change in bowel patterns with new constipation.  Constipation seems to aggravate some of the above, also causes a fullness as if he cannot catch his breath or draw a full breath.  Passing a large bowel movement relieves this sensation of unable to catch his breath.    Mr. Tejada recently tried psyllium laxative product with relief.  Taking it for about a week gave good relief but he discontinued per the labeling.  We discussed importance  of continuing on a tolerable daily bowel regimen regimen such as the psyllium today.    Previously evaluated by our partner Dr. Karel Garza over 5 years ago for loose stools, question of diarrhea.    Other concerns described today include new pruritus at the tops of both feet, headaches focused on the left side of his head.    Recent labs 7/14/2023:  Normal CBC, hemoglobin 16.5g normocytic  AST 22 ALT 19 alkaline phosphatase 52 serum albumin 4.4  Reassuring essentially negative UA 7/14/2023    Suggest:    Complex syndrome of nausea, LUQ and epigastric abdominal pain which appears to be related to eating    Multiple foci of abdominal pain include LUQ and epigastrium, occasional RUQ tenderness  Reassuring physical exam today  Some weight loss, approximately 14 LBS but charted weights  Reassuring liver and gallbladder ultrasound July 2023; normal biliary tree no cholelithiasis  Today we discussed further evaluation of the symptoms with cross-sectional imaging, consideration for further invasive testing.  I recommended completing our workup with cross-sectional imaging, repeat CT scan of abdomen pelvis with oral and IV contrast to better evaluate pancreas biliary tree upper abdominal organs and lymph nodes  Expedited EGD examination to rule out gastric or duodenal ulcer disease or malignancy    2.  Change in bowel habits, recent constipation  Today I recommended continuing on the psyllium fiber supplement which worked well when he took it  Follow-up with us regarding this concern in the next few weeks to months; could also consider other osmotic laxatives, stool softeners, magnesium laxatives    3.  Colon cancer screening, average risk  Reassuring colonoscopy examination March 2018 as above  EGD examination and CT scan for evaluation of recent symptoms as above  Repeat colonoscopy examination March 2028 or as per clinical course        Consent:    I recommended EGD examination with possible biopsy, possible stricture  dilation under MAC sedation .  We discussed the nature and risks of EGD examination including sedation, anesthesia risks; bleeding, esophagus/stomach/duodenal injury or perforation, infection. The patient understood these risks and agrees to proceed.  The need for an accompanying adult to provide a ride home or escort home was also discussed.        CT scan ordered today as above.      Over 45 minutes spent today discussing the above and counseling this patient, reviewing chart notes and data and composing this note.     Digital transcription software was utilized to produce this note. The note was proofread for content only. Typographical errors may remain.       Meds This Visit:  Requested Prescriptions      No prescriptions requested or ordered in this encounter       Imaging & Referrals:  None       ID#[id number]

## 2024-01-29 NOTE — PATIENT INSTRUCTIONS
Schedule EGD (stomach endoscopy) exam at [facility] ([facility])  [facility] insurance    BMI Readings from Last 1 Encounters:   01/29/24 21.13 kg/m²       MAC anesthesia    DX = epigastric abdominal pain, LUQ abd pain, nausea abnormal weight loss    Medication instructions:    None

## 2024-01-29 NOTE — TELEPHONE ENCOUNTER
Scheduled for:  EGD 68125  Provider Name:  Dr. Small  Date:  1/31/2024  Location:  [facility]  Sedation:  MAC  Time:  1:00 pm, (pt is aware that [facility] will call the day before to confirm arrival time)  Prep:  NPO after midnight  Meds/Allergies Reconciled?:  Physician reviewed  Diagnosis with codes:  Epigastric pain R10.13; LUQ pain R10.12; Nausea R11.0; Abnormal weight loss R63.4  Was patient informed to call insurance with codes (Y/N):  Yes  Referral sent?:  Referral was sent at the time of electronic surgical scheduling.  EM or [facility] notified?:  I sent an electronic request to Endo Scheduling and received a confirmation today.    Medication Orders:  N/A  Misc Orders:  N/A     Further instructions given by staff:  Prep instructions were given to pt in the office, pt verbalized understanding.

## 2024-01-31 ENCOUNTER — TELEPHONE (OUTPATIENT)
Facility: CLINIC | Age: 59
End: 2024-01-31

## 2024-01-31 DIAGNOSIS — R10.13 ABDOMINAL PAIN, EPIGASTRIC: Primary | ICD-10-CM

## 2024-01-31 DIAGNOSIS — R63.4 LOSS OF WEIGHT: ICD-10-CM

## 2024-01-31 DIAGNOSIS — R11.0 NAUSEA: ICD-10-CM

## 2024-01-31 DIAGNOSIS — R10.12 ABDOMINAL PAIN, LEFT UPPER QUADRANT: ICD-10-CM

## 2024-01-31 NOTE — TELEPHONE ENCOUNTER
Canceled  for:  EGD 31842  Provider Name:  Dr. Small  Date:  1/31/2024  Location:  [facility]  Sedation:  MAC  Time:  1:00 pm, (pt is aware that [facility] will call the day before to confirm arrival time)  Prep:  NPO after midnight  Meds/Allergies Reconciled?:  Physician reviewed  Diagnosis with codes:  Epigastric pain R10.13; LUQ pain R10.12; Nausea R11.0; Abnormal weight loss R63.4    Canceled in Epic and Epic

## 2024-06-01 NOTE — ANESTHESIA PREPROCEDURE EVALUATION
Anesthesia PreOp Note    HPI:     Mercy Kauffman is a 47year old male who presents for preoperative consultation requested by: Kelly Kaplan MD    Date of Surgery: 3/20/2018    Procedure(s):  COLONOSCOPY  Indication: LLQ abdominal pain   Colon cance Anesthesia ROS/Med Hx and Physical Exam     Patient summary reviewed and Nursing notes reviewed    Airway   Mallampati: II  TM distance: >3 FB  Neck ROM: full  Dental - normal exam     Pulmonary - normal exam   (+) shortness of breath,   Cardiovascu No

## (undated) DEVICE — ENDOSCOPY PACK - LOWER: Brand: MEDLINE INDUSTRIES, INC.

## (undated) DEVICE — Device: Brand: DEFENDO AIR/WATER/SUCTION AND BIOPSY VALVE

## (undated) DEVICE — SNARE OPTMZ PLPCTM TRP

## (undated) DEVICE — FORCEP RADIAL JAW 4

## (undated) NOTE — LETTER
[address], [place]  Authorization for Invasive Procedures  1.  I hereby authorize Dr. Ra Naranjo* , my physician and whomever may be designated as the doctor's assistant, to perform the following operation and/or procedure:  *COLONO performed for the purposes of advancing medicine, science, and/or education, provided my identity is not revealed. If the procedure has been videotaped, the physician/surgeon will obtain the original videotape.  The hospital will not be responsible for stor My signature below affirms that prior to the time of the procedure, I have explained to the patient and/or his legal representative, the risks and benefits involved in the proposed treatment and any reasonable alternative to the proposed treatment.  I have

## (undated) NOTE — LETTER
[facility], [address]  [address] [place] [address] 30: [phone]  FAX: [phone]    10/09/23              To Whom It May Concern:    Merced Turner, is currently under my medical care. He may return to work on 10/11/23. If you require additional information please contact our office at [phone].           Sincerely,        Aura Cotton MD

## (undated) NOTE — LETTER
[facility]  Administration of Anesthesia  1. I, Alexx Gonzalez, or _________________________________ acting on his behalf, (Patient) (Dependent/Representative) request to receive anesthesia for my pending procedure/operation/treatment. infections, high spinal block, spinal bleeding, seizure, cardiac arrest and death. 7. AWARENESS: I understand that it is possible (but unlikely) to have explicit memory of events from the operating room while under general anesthesia.   8. ELECTROCONVULSIV unconscious pt /Relationship    My signature below affirms that prior to the time of the procedure, I have explained to the patient and/or his/her guardian, the risks and benefits of undergoing anesthesia, as well as any reasonable alternatives.     _______